# Patient Record
Sex: FEMALE | Race: WHITE | Employment: UNEMPLOYED | URBAN - METROPOLITAN AREA
[De-identification: names, ages, dates, MRNs, and addresses within clinical notes are randomized per-mention and may not be internally consistent; named-entity substitution may affect disease eponyms.]

---

## 2018-08-02 ENCOUNTER — PRE-EVALUATION (OUTPATIENT)
Dept: ORTHOPEDIC SURGERY | Age: 53
End: 2018-08-02

## 2018-08-02 ENCOUNTER — OFFICE VISIT (OUTPATIENT)
Dept: ORTHOPEDIC SURGERY | Age: 53
End: 2018-08-02

## 2018-08-02 VITALS — WEIGHT: 123 LBS | HEIGHT: 62 IN | BODY MASS INDEX: 22.63 KG/M2 | RESPIRATION RATE: 16 BRPM

## 2018-08-02 DIAGNOSIS — M22.2X1 PATELLOFEMORAL PAIN SYNDROME OF RIGHT KNEE: ICD-10-CM

## 2018-08-02 DIAGNOSIS — M65.9 SYNOVITIS OF RIGHT KNEE: Primary | ICD-10-CM

## 2018-08-02 DIAGNOSIS — M17.11 PRIMARY OSTEOARTHRITIS OF RIGHT KNEE: ICD-10-CM

## 2018-08-02 DIAGNOSIS — M25.561 ACUTE PAIN OF RIGHT KNEE: ICD-10-CM

## 2018-08-02 DIAGNOSIS — M65.9 SYNOVITIS OF RIGHT KNEE: ICD-10-CM

## 2018-08-02 PROCEDURE — 99243 OFF/OP CNSLTJ NEW/EST LOW 30: CPT | Performed by: ORTHOPAEDIC SURGERY

## 2018-08-02 PROCEDURE — APPSS15 APP SPLIT SHARED TIME 0-15 MINUTES: Performed by: NURSE PRACTITIONER

## 2018-08-02 RX ORDER — MELOXICAM 15 MG/1
15 TABLET ORAL DAILY
Qty: 30 TABLET | Refills: 1 | Status: SHIPPED | OUTPATIENT
Start: 2018-08-02 | End: 2020-08-27

## 2018-08-02 RX ORDER — METHYLPREDNISOLONE 4 MG/1
TABLET ORAL
Qty: 1 KIT | Refills: 0 | Status: SHIPPED | OUTPATIENT
Start: 2018-08-02 | End: 2018-08-08

## 2018-08-02 NOTE — PROGRESS NOTES
Nimo Raya  [id number]  August 2, 2018    Chief Complaint   Patient presents with    Knee Pain     right knee        History: The patient is a 28-year-old female here today for evaluation regarding her right knee pain. The patient is extremely active individual and is a  during the winter and [employer] [facility] a regular basis throughout the year. She has noted these symptoms fairly constantly for the last month. She denies 1 specific inciting event or injury. She describes pain over the medial aspect of her knee which seems to be aggravated after playing pickle ball. This does improve temporarily with Advil but this seems to be causing some GI upset. She denies any catching or locking symptoms. She denies prior treatments for this. She did lose a fair amount of weight in the last year. This is a consult for right knee pain from Orquidea Mitchell    The patient's  past medical history, medications, allergies,  family history, social history, and have been reviewed, and dated and are recorded in the chart. Pertinent items are noted in HPI. Review of systems reviewed from Pertinent History Form dated on 8/2/18 and available in the patient's chart under the Media tab. Vitals:  Resp 16   Ht 5' 2\" (1.575 m)   Wt 123 lb (55.8 kg)   BMI 22.50 kg/m²     Physical: Ms. Nimo Raya appears well, she is in no apparent distress, she demonstrates appropriate mood & affect. She is alert and oriented to person, place and time. Examination of the right lower extremity reveals no pain with range of motion of the hip. She has no tenderness to palpation about the joint line of the right knee. She does have moderate tenderness to deep palpation over the medial plica. Brittny's maneuvers negative. Range of motion is from 0 degrees to 130 degrees. Strength is 4+ to 5/5 for all muscle groups about the right knee. There is patellofemoral crepitus with range of motion of the right knee.  Varus and valgus stressing of the knees reveals no evidence of instability. There is no effusion in the right knee. Anterior drawer and Lachman are negative bilaterally. Examination of the skin reveals no rashes, ulceration, or lesion, bilaterally in the lower extremities. Sensation to both lower extremities is grossly intact. Exam of both feet reveals pedal pulses intact and brisk cap refill. Patient is able to dorsiflex and wiggle all toes. Deep tendon reflexes of the lower extremities are normal and symmetric. Imagin views of the right knee obtained in the office today were extensively reviewed. There is evidence of mild osteoarthritis of the medial compartment of the right knee. Impression: #1  Right knee synovitis #2  Right knee osteoarthritis   3. Right knee patellofemoral syndrome/patellar chondromalacia    Plan: At this time, we will treat the patient conservatively. The patient was encouraged to modify her activities. She was instructed on a home exercise program.  She was given a Medrol Dosepak. She was also given a prescription for meloxicam.  She may gradually resume her regular activities after the steroids are completed. She will follow-up with me in approximately 1 month and we will reassess her then. If she continues to have pain, we will then consider an injection. The patient may ultimately require an MRI scan to evaluate for medial plica.

## 2018-10-15 ENCOUNTER — OFFICE VISIT (OUTPATIENT)
Dept: ORTHOPEDIC SURGERY | Age: 53
End: 2018-10-15
Payer: COMMERCIAL

## 2018-10-15 VITALS
BODY MASS INDEX: 23.19 KG/M2 | DIASTOLIC BLOOD PRESSURE: 85 MMHG | HEART RATE: 81 BPM | HEIGHT: 62 IN | TEMPERATURE: 98.4 F | SYSTOLIC BLOOD PRESSURE: 128 MMHG | WEIGHT: 126 LBS

## 2018-10-15 DIAGNOSIS — M22.2X1 PATELLOFEMORAL PAIN SYNDROME OF RIGHT KNEE: Primary | ICD-10-CM

## 2018-10-15 PROCEDURE — 99214 OFFICE O/P EST MOD 30 MIN: CPT | Performed by: ORTHOPAEDIC SURGERY

## 2018-10-15 PROCEDURE — 20610 DRAIN/INJ JOINT/BURSA W/O US: CPT | Performed by: ORTHOPAEDIC SURGERY

## 2018-12-18 ENCOUNTER — OFFICE VISIT (OUTPATIENT)
Dept: ORTHOPEDIC SURGERY | Age: 53
End: 2018-12-18
Payer: COMMERCIAL

## 2018-12-18 VITALS
HEIGHT: 62 IN | SYSTOLIC BLOOD PRESSURE: 132 MMHG | DIASTOLIC BLOOD PRESSURE: 80 MMHG | HEART RATE: 73 BPM | BODY MASS INDEX: 23.19 KG/M2 | WEIGHT: 126 LBS | TEMPERATURE: 97.9 F

## 2018-12-18 DIAGNOSIS — M17.11 ARTHRITIS OF RIGHT KNEE: Primary | ICD-10-CM

## 2018-12-18 PROCEDURE — 99212 OFFICE O/P EST SF 10 MIN: CPT | Performed by: PHYSICIAN ASSISTANT

## 2018-12-19 PROBLEM — M17.11 ARTHRITIS OF RIGHT KNEE: Status: ACTIVE | Noted: 2018-12-19

## 2018-12-19 NOTE — PROGRESS NOTES
questions were answered. Risks and benefits of the treatment options also reviewed in detail. She has moderate arthritis of the right knee and fairly young 46-year-old female. Cortisone injection gave good relief short-term. Discussed Visco supplementation injections for the right knee. She is interested in pursuing Visco supplementation. Follow Up: Once the Visco supplementation injection has been authorized. Call or return to clinic prn if these symptoms worsen or fail to improve as anticipated.

## 2019-01-03 ENCOUNTER — TELEPHONE (OUTPATIENT)
Dept: ORTHOPEDIC SURGERY | Age: 54
End: 2019-01-03

## 2020-08-27 ENCOUNTER — APPOINTMENT (OUTPATIENT)
Dept: GENERAL RADIOLOGY | Age: 55
End: 2020-08-27
Payer: COMMERCIAL

## 2020-08-27 ENCOUNTER — HOSPITAL ENCOUNTER (EMERGENCY)
Age: 55
Discharge: HOME | End: 2020-08-27
Attending: EMERGENCY MEDICINE
Payer: COMMERCIAL

## 2020-08-27 VITALS
RESPIRATION RATE: 16 BRPM | SYSTOLIC BLOOD PRESSURE: 137 MMHG | HEART RATE: 74 BPM | BODY MASS INDEX: 25.84 KG/M2 | TEMPERATURE: 98.3 F | HEIGHT: 62 IN | OXYGEN SATURATION: 99 % | WEIGHT: 140.43 LBS | DIASTOLIC BLOOD PRESSURE: 81 MMHG

## 2020-08-27 PROCEDURE — 6370000000 HC RX 637 (ALT 250 FOR IP): Performed by: EMERGENCY MEDICINE

## 2020-08-27 PROCEDURE — 99283 EMERGENCY DEPT VISIT LOW MDM: CPT

## 2020-08-27 PROCEDURE — 73610 X-RAY EXAM OF ANKLE: CPT

## 2020-08-27 RX ORDER — ACETAMINOPHEN 500 MG
1000 TABLET ORAL ONCE
Status: COMPLETED | OUTPATIENT
Start: 2020-08-27 | End: 2020-08-27

## 2020-08-27 RX ADMIN — ACETAMINOPHEN 1000 MG: 500 TABLET, FILM COATED ORAL at 10:38

## 2020-08-27 ASSESSMENT — PAIN SCALES - GENERAL
PAINLEVEL_OUTOF10: 3
PAINLEVEL_OUTOF10: 3
PAINLEVEL_OUTOF10: 4

## 2020-08-27 ASSESSMENT — PAIN DESCRIPTION - DESCRIPTORS: DESCRIPTORS: ACHING

## 2020-08-27 ASSESSMENT — PAIN DESCRIPTION - LOCATION: LOCATION: ANKLE

## 2020-08-27 ASSESSMENT — PAIN DESCRIPTION - ORIENTATION: ORIENTATION: LEFT

## 2020-08-27 ASSESSMENT — PAIN DESCRIPTION - PAIN TYPE: TYPE: ACUTE PAIN

## 2020-08-27 NOTE — ED TRIAGE NOTES
Pt. Was playing pickle ball and rolled her left ankle, has not tried to walk since injury. Swelling noted lateral malleolus, strong pedal pulse present. Ice applied.

## 2020-08-27 NOTE — ED PROVIDER NOTES
Emergency Department provider note:    [facility]    CHIEF COMPLAINT  Ankle Injury (left ankle injury at 0930, rolled ankle playing pickle ball)      HISTORY OF PRESENT ILLNESS  Chiqui Coker is a 50 y.o. female who presents to the ED with pain in her left ankle. She was playing pickle ball about an hour ago and accidentally rolled her left ankle. No prior, no other injury. She complains of pain distal to the left lateral malleolus. She heard a snap when the incident happened. She is able to ambulate. No other complaints, modifying factors or associated symptoms. Nursing notes reviewed. History reviewed. No pertinent past medical history. Past Surgical History:   Procedure Laterality Date     SECTION      x1       History reviewed. No pertinent family history. Social History     Tobacco Use    Smoking status: Never Smoker    Smokeless tobacco: Never Used   Substance Use Topics    Alcohol use: No    Drug use: Never       No current facility-administered medications for this encounter. No current outpatient medications on file. 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 REVIEW OF SYSTEMS  10 systems reviewed, pertinent positives per HPI otherwise noted to be negative    PHYSICAL EXAM  /81   Pulse 74   Temp 98.3 °F (36.8 °C) (Oral)   Resp 16   Ht 5' 2\" (1.575 m)   Wt 140 lb 6.9 oz (63.7 kg)   LMP 2020   SpO2 99%   BMI 25.69 kg/m²   GENERAL APPEARANCE: Awake and alert. Cooperative. No acute distress. HEAD: Normocephalic. Atraumatic. EYES: EOM's grossly intact. ENT: Mucous membranes are moist.   NECK: Supple. Normal ROM. CHEST: Equal symmetric chest rise. LUNGS: Breathing is unlabored. Speaking comfortably in full sentences. HEART:  Regular rhythm. ABDOMEN: Nondistended. No masses. EXTREMITIES: Moves actively. No acute deformities.   She is swollen and tender distal to the lateral malleolus left side. No crepitance over the distal fibula or over the calcaneus. No tenderness over the proximal fifth metatarsal.  No other bony injury noted. Distal pulses, capillary refill, and sensation are intact. She has some limited movement of the ankle. SKIN: Warm and dry. NEUROLOGICAL: Alert and oriented. Strength is 5/5 in all extremities and sensation is intact. LABS  No results found for this visit on 08/27/20. RADIOLOGY  XR ANKLE LEFT (MIN 3 VIEWS)   Preliminary Result   No acute osseous abnormality           I ordered and interpreted x-ray of her left ankle. I see no bony abnormality. This was confirmed by radiology. ED COURSE/MDM  Patient seen and evaluated. Patient was evaluated and x-ray done. No fracture is seen. She was fitted for and placed in an ankle splint with instructions for use. She can follow with orthopedic surgery on call in about 10 days. She does not need crutches. I considered potential for bony fracture or an unstable ankle. Neither are present. Patient was given:   Medications   acetaminophen (TYLENOL) tablet 1,000 mg (1,000 mg Oral Given 8/27/20 1038)        Patient was given scripts for the following medications. I counseled patient how to take these medications. Discharge Medication List as of 8/27/2020 11:13 AM          PATIENT REFERRED TO:  Gregorio Ceja MD  [address] [address]  [phone]    In 1 week  If symptoms worsen       CLINICAL IMPRESSION  1. Sprain of calcaneofibular ligament of left ankle, initial encounter        Blood pressure 137/81, pulse 74, temperature 98.3 °F (36.8 °C), temperature source Oral, resp. rate 16, height 5' 2\" (1.575 m), weight 140 lb 6.9 oz (63.7 kg), last menstrual period 08/27/2020, SpO2 99 %.     DISPOSITION Decision To Discharge 08/27/2020 10:55:50 AM      Comment: Please note this report has been produced using speech recognition software and may contain errors related to that system including errors in grammar, punctuation, and spelling, as well as words and phrases that may be inappropriate. If there are any questions or concerns please feel free to contact the dictating provider for clarification.         Adeola Trevizo MD  08/27/20 1256

## 2020-09-03 ENCOUNTER — OFFICE VISIT (OUTPATIENT)
Dept: ORTHOPEDIC SURGERY | Age: 55
End: 2020-09-03
Payer: COMMERCIAL

## 2020-09-03 VITALS — BODY MASS INDEX: 25.76 KG/M2 | WEIGHT: 140 LBS | TEMPERATURE: 98 F | HEIGHT: 62 IN

## 2020-09-03 PROCEDURE — 99243 OFF/OP CNSLTJ NEW/EST LOW 30: CPT | Performed by: ORTHOPAEDIC SURGERY

## 2020-09-03 PROCEDURE — L4361 PNEUMA/VAC WALK BOOT PRE OTS: HCPCS | Performed by: ORTHOPAEDIC SURGERY

## 2020-09-03 NOTE — PROGRESS NOTES
Delores Miles  <[id number]>  September 3, 2020    Chief Complaint   Patient presents with    Ankle Injury     left ankle DOI 8/27/20       History: The patient is a 68-year-old female who is here for evaluation of her left ankle. The patient was playing pickle ball and she severely inverted her left ankle. This injury occurred on 8/27/2020. She did present to the emergency room and was evaluated. She has been taking Motrin on a regular basis. She denies any numbness or tingling. She denies any history of prior ankle sprains. This is a consult from Pam Mosher MD for left ankle pain and swelling. The patient's  past medical history, medications, allergies,  family history, social history, and have been reviewed, and dated and are recorded in the chart. Pertinent items are noted in HPI. Review of systems reviewed from Pertinent History Form dated on 9/3/20 and available in the patient's chart under the Media tab. Vitals:  Temp 98 °F (36.7 °C)   Ht 5' 2\" (1.575 m)   Wt 140 lb (63.5 kg)   LMP 08/27/2020   BMI 25.61 kg/m²     Physical: Physical Examination:   The patient presents today in no acute distress, awake, alert, and oriented. She is well dressed, nourished and  groomed. Patient with normal affect. The patient ambulates with an antalgic gait. Examination of both ankles showing a decreased range of motion of the left ankle compare to the other side. There is  severe swelling that can be seen, as well as ecchymosis. She has severe tenderness over the anterior talofibular ligament and calcaneofibular ligament. The patient is non tender over the posterior talofibular ligament. The patient has severe tenderness over the syndesmosis. She has intact sensation to light touch in the tibial, peroneal, sural, and saphenous nerve distributions. Pedal pulses are present 2+. The foot shows brisk capillary refill. The patient is able to wiggle all toes.  The patient is non tender to palpation of the mid foot, hind foot, or forefoot. The skin reveals no evidence of blistering or open areas. Imaging:  Xrays, 3 views of the left ankle from initial evaluation were reviewed, and showed no abnormalities. Impression:   Left ankle Grade 2 sprain  #2 left ankle syndesmotic sprain      Plan:   The treatment plan was discussed in detail with the patient. The left ankle will be protected in a tall boot. She may weight bear as tolerated. She was encouraged to ice and elevate as much as possible. The patient will follow up with me in 2-3 week and we will reassess her then.   We will then consider an outpatient physical therapy program.

## 2020-09-04 ENCOUNTER — TELEPHONE (OUTPATIENT)
Dept: ORTHOPEDIC SURGERY | Age: 55
End: 2020-09-04

## 2020-11-16 ENCOUNTER — TELEPHONE (OUTPATIENT)
Dept: ORTHOPEDIC SURGERY | Age: 55
End: 2020-11-16

## 2020-11-16 NOTE — TELEPHONE ENCOUNTER
I called the patient and she did not answer and mailbox was full. I was unable to leave a message. The patient has not followed up as instructed by Dr. Isaura Moreno. She needs an office visit prior to prescribing physical therapy. Please schedule the patient if she calls back.

## 2023-05-08 ENCOUNTER — NURSE TRIAGE (OUTPATIENT)
Dept: OTHER | Facility: CLINIC | Age: 58
End: 2023-05-08

## 2023-05-08 NOTE — TELEPHONE ENCOUNTER
[place]    Received call from [address] [place] [address] at [facility]; Patient with Red Flag Complaint requesting to establish care with [facility]. Prefers woman provider. Sees new OB on Wednesday. Subjective: Caller states \"Abdominal pain\"     Current Symptoms:   Left lower abdominal pain x 3 weeks  Bloating   Denies diarrhea or vomiting, BMs normal.  Denies fever    Hx lactose intolerance. Mother had colon cancer. Onset: November - seen in clinic and dx with kidney stone but was retracted by urologist.      Associated Symptoms: increased wakefulness    Pain Severity: Muscle spasm or \"vibration\"    Improved when up and moving - \"ache\" during the day. 3-4/10 currently \"bothersome\"    Temperature: Denies    What has been tried: GasX after eating, this helps    LMP: NA Pregnant: NA.   Menopause currently. Recommended disposition: See in Office Today. As pt is unestablished, recommended clinic or UCC today. Care advice provided, patient verbalizes understanding; denies any other questions or concerns; instructed to call back for any new or worsening symptoms. Patient/Caller agrees with recommended disposition; writer provided warm transfer to [employer] at [facility] for appointment scheduling to establish care. Attention Provider: Thank you for allowing me to participate in the care of your patient. The patient was connected to triage in response to information provided to the [facility]. Please do not respond through this encounter as the response is not directed to a shared pool.     Reason for Disposition   MODERATE pain (e.g., interferes with normal activities that comes and goes (cramps) lasts > 24 hours  (Exception: Pain with Vomiting or Diarrhea - see that Protocol.)    Protocols used: Abdominal Pain - Female-ADULT-[place]

## 2023-05-09 ENCOUNTER — HOSPITAL ENCOUNTER (OUTPATIENT)
Dept: CT IMAGING | Age: 58
Discharge: HOME | End: 2023-05-09
Payer: COMMERCIAL

## 2023-05-09 DIAGNOSIS — R10.32 LLQ PAIN: ICD-10-CM

## 2023-05-09 PROCEDURE — 74176 CT ABD & PELVIS W/O CONTRAST: CPT

## 2023-05-09 PROCEDURE — 6360000004 HC RX CONTRAST MEDICATION

## 2023-05-09 RX ADMIN — IOPAMIDOL 50 ML: 612 INJECTION, SOLUTION INTRAVENOUS at 09:38

## 2023-05-30 ENCOUNTER — OFFICE VISIT (OUTPATIENT)
Dept: FAMILY MEDICINE CLINIC | Age: 58
End: 2023-05-30
Payer: COMMERCIAL

## 2023-05-30 VITALS
SYSTOLIC BLOOD PRESSURE: 118 MMHG | HEIGHT: 62 IN | WEIGHT: 144 LBS | TEMPERATURE: 97.8 F | HEART RATE: 75 BPM | OXYGEN SATURATION: 98 % | BODY MASS INDEX: 26.5 KG/M2 | DIASTOLIC BLOOD PRESSURE: 68 MMHG

## 2023-05-30 DIAGNOSIS — N39.3 STRESS INCONTINENCE: ICD-10-CM

## 2023-05-30 DIAGNOSIS — Z00.00 ANNUAL PHYSICAL EXAM: Primary | ICD-10-CM

## 2023-05-30 DIAGNOSIS — Z13.220 LIPID SCREENING: ICD-10-CM

## 2023-05-30 DIAGNOSIS — Z23 NEED FOR TDAP VACCINATION: ICD-10-CM

## 2023-05-30 DIAGNOSIS — Z80.0 FAMILY HX OF COLON CANCER REQUIRING SCREENING COLONOSCOPY: ICD-10-CM

## 2023-05-30 PROCEDURE — 90715 TDAP VACCINE 7 YRS/> IM: CPT | Performed by: NURSE PRACTITIONER

## 2023-05-30 PROCEDURE — 99396 PREV VISIT EST AGE 40-64: CPT | Performed by: NURSE PRACTITIONER

## 2023-05-30 PROCEDURE — 90471 IMMUNIZATION ADMIN: CPT | Performed by: NURSE PRACTITIONER

## 2023-05-30 RX ORDER — PROGESTERONE 100 MG/1
100 CAPSULE ORAL DAILY
COMMUNITY

## 2023-05-30 SDOH — ECONOMIC STABILITY: FOOD INSECURITY: WITHIN THE PAST 12 MONTHS, YOU WORRIED THAT YOUR FOOD WOULD RUN OUT BEFORE YOU GOT MONEY TO BUY MORE.: NEVER TRUE

## 2023-05-30 SDOH — ECONOMIC STABILITY: FOOD INSECURITY: WITHIN THE PAST 12 MONTHS, THE FOOD YOU BOUGHT JUST DIDN'T LAST AND YOU DIDN'T HAVE MONEY TO GET MORE.: NEVER TRUE

## 2023-05-30 SDOH — ECONOMIC STABILITY: INCOME INSECURITY: HOW HARD IS IT FOR YOU TO PAY FOR THE VERY BASICS LIKE FOOD, HOUSING, MEDICAL CARE, AND HEATING?: NOT HARD AT ALL

## 2023-05-30 SDOH — ECONOMIC STABILITY: HOUSING INSECURITY
IN THE LAST 12 MONTHS, WAS THERE A TIME WHEN YOU DID NOT HAVE A STEADY PLACE TO SLEEP OR SLEPT IN A SHELTER (INCLUDING NOW)?: NO

## 2023-05-30 ASSESSMENT — ENCOUNTER SYMPTOMS
SHORTNESS OF BREATH: 0
CONSTIPATION: 0
DIARRHEA: 0
VOMITING: 0
COUGH: 0
NAUSEA: 0

## 2023-05-30 NOTE — PROGRESS NOTES
Bernice Joseph (:  1965) is a 66 y.o. female,New patient, here for evaluation of the following chief complaint(s):  New Patient (New patient to get established. Colonoscopy referral. Colon cancer in family . Mammo scheduled for Steve. Pt would like her TDAP today. Is not fasting )         ASSESSMENT/PLAN:  1. Annual physical exam  Reviewed PMH, medications and labs    2. Need for Tdap vaccination  Updated in office today. 3. Family hx of colon cancer requiring screening colonoscopy  Overdue for colonoscopy. Mom had colon ca in 46s, returned at age 80.  - Archie Avendano MD, Gastroenterology, [facility]    4. Stress incontinence  New, worsening. Discussed PT and possible follow up with Urogyn.  - PT pelvic floor activity; Future    5. Lipid screening  Overdue for lipid screen. - Lipid, Fasting; Future       Return for yearly physical.         Subjective   SUBJECTIVE/OBJECTIVE:  HPI  Presents today to establish care and for yearly physical. PMH significant for family colon ca and arthritis. Denies concerns or complaints today. States she needs tetanus updated. Lives at home with , 2  kids. Independent in ADLs. Needs to go to eye doc, wears readers. Dentist every 6 months. Cardiac- denies concerns. Resp-flonase, for congestin    GI/- Had lower abd \"spasms\" treated at urgent care, went to urology, r/o kidney stones. Had CT of abd, small hiatal hernia recently started on omeprazole, reports relief. States she has stress incontinence with sneezing, running or exercise, wears pad daily. Mammogram scheduled in  at [address] [place] [address]- at [facility] [place]. Recently started progesterone at night, PAP last month. Skin-denies concerns.      Current Outpatient Medications   Medication Sig Dispense Refill    progesterone (PROMETRIUM) 100 MG CAPS capsule Take 1 capsule by mouth daily      Cyanocobalamin (VITAMIN B-12 PO) Take by mouth      Multiple Vitamin (MULTIVITAMIN

## 2023-05-30 NOTE — PATIENT INSTRUCTIONS
[facility] [address]  [address] [facility]  [phone]

## 2024-07-18 ENCOUNTER — TELEPHONE (OUTPATIENT)
Dept: FAMILY MEDICINE CLINIC | Age: 59
End: 2024-07-18

## 2024-07-18 NOTE — TELEPHONE ENCOUNTER
Patient due for mammogram. Patient due for one year/physical with Joslyn VELASCO LM for patient to call the office.

## 2024-09-19 ENCOUNTER — TELEPHONE (OUTPATIENT)
Dept: FAMILY MEDICINE CLINIC | Age: 59
End: 2024-09-19

## 2025-05-15 ENCOUNTER — OFFICE VISIT (OUTPATIENT)
Dept: FAMILY MEDICINE CLINIC | Age: 60
End: 2025-05-15
Payer: COMMERCIAL

## 2025-05-15 VITALS
TEMPERATURE: 98.6 F | HEIGHT: 62 IN | BODY MASS INDEX: 28.23 KG/M2 | DIASTOLIC BLOOD PRESSURE: 80 MMHG | HEART RATE: 76 BPM | OXYGEN SATURATION: 98 % | WEIGHT: 153.4 LBS | SYSTOLIC BLOOD PRESSURE: 124 MMHG

## 2025-05-15 DIAGNOSIS — G43.919 INTRACTABLE MIGRAINE WITHOUT STATUS MIGRAINOSUS, UNSPECIFIED MIGRAINE TYPE: Primary | ICD-10-CM

## 2025-05-15 PROCEDURE — G8419 CALC BMI OUT NRM PARAM NOF/U: HCPCS | Performed by: INTERNAL MEDICINE

## 2025-05-15 PROCEDURE — G8427 DOCREV CUR MEDS BY ELIG CLIN: HCPCS | Performed by: INTERNAL MEDICINE

## 2025-05-15 PROCEDURE — 99213 OFFICE O/P EST LOW 20 MIN: CPT | Performed by: INTERNAL MEDICINE

## 2025-05-15 PROCEDURE — 3017F COLORECTAL CA SCREEN DOC REV: CPT | Performed by: INTERNAL MEDICINE

## 2025-05-15 PROCEDURE — 1036F TOBACCO NON-USER: CPT | Performed by: INTERNAL MEDICINE

## 2025-05-15 RX ORDER — MECLIZINE HCL 12.5 MG 12.5 MG/1
12.5 TABLET ORAL 3 TIMES DAILY PRN
Qty: 30 TABLET | Refills: 0 | Status: SHIPPED | OUTPATIENT
Start: 2025-05-15 | End: 2025-05-25

## 2025-05-15 RX ORDER — SUMATRIPTAN 50 MG/1
TABLET, FILM COATED ORAL
Qty: 9 TABLET | Refills: 3 | Status: SHIPPED | OUTPATIENT
Start: 2025-05-15

## 2025-05-15 RX ORDER — ONDANSETRON 4 MG/1
4 TABLET, ORALLY DISINTEGRATING ORAL 3 TIMES DAILY PRN
Qty: 21 TABLET | Refills: 0 | Status: SHIPPED | OUTPATIENT
Start: 2025-05-15

## 2025-05-15 SDOH — ECONOMIC STABILITY: FOOD INSECURITY: WITHIN THE PAST 12 MONTHS, YOU WORRIED THAT YOUR FOOD WOULD RUN OUT BEFORE YOU GOT MONEY TO BUY MORE.: NEVER TRUE

## 2025-05-15 SDOH — ECONOMIC STABILITY: FOOD INSECURITY: WITHIN THE PAST 12 MONTHS, THE FOOD YOU BOUGHT JUST DIDN'T LAST AND YOU DIDN'T HAVE MONEY TO GET MORE.: NEVER TRUE

## 2025-05-15 ASSESSMENT — PATIENT HEALTH QUESTIONNAIRE - PHQ9
SUM OF ALL RESPONSES TO PHQ QUESTIONS 1-9: 0
SUM OF ALL RESPONSES TO PHQ QUESTIONS 1-9: 0
1. LITTLE INTEREST OR PLEASURE IN DOING THINGS: NOT AT ALL
SUM OF ALL RESPONSES TO PHQ QUESTIONS 1-9: 0
SUM OF ALL RESPONSES TO PHQ QUESTIONS 1-9: 0
2. FEELING DOWN, DEPRESSED OR HOPELESS: NOT AT ALL

## 2025-05-15 ASSESSMENT — ENCOUNTER SYMPTOMS: PHOTOPHOBIA: 1

## 2025-05-15 NOTE — PROGRESS NOTES
Catarina Thakkar (:  1965) is a 60 y.o. female,Established patient, here for evaluation of the following chief complaint(s):  Migraine (Pressure in head, dizziness, vision blurry x 4 days)      Subjective       HPI    Headache. Going on for 4 days. Pressure in back/top of head.  Associated with dizziness/vertigo. Some nausea/vomiting. Photophobia. No phonophobia.  In am worse, and then better when can eat and take meds.  Pt has been taking tylenol and nsaid.  Did help but never went away.   No visual aura.    Pt had one migraine headache at the onset of menopause but not had one since.      Review of Systems   Eyes:  Positive for photophobia.   Neurological:  Positive for dizziness and headaches. Negative for weakness.             Objective     /80 (BP Site: Left Upper Arm, Patient Position: Sitting, BP Cuff Size: Medium Adult)   Pulse 76   Temp 98.6 °F (37 °C) (Temporal)   Ht 1.575 m (5' 2\")   Wt 69.6 kg (153 lb 6.4 oz)   SpO2 98%   BMI 28.06 kg/m²      Physical Exam  Vitals and nursing note reviewed.   Constitutional:       Appearance: Normal appearance.   HENT:      Head: Normocephalic and atraumatic.   Eyes:      Extraocular Movements: Extraocular movements intact.      Pupils: Pupils are equal, round, and reactive to light.   Cardiovascular:      Rate and Rhythm: Normal rate and regular rhythm.      Pulses: Normal pulses.      Heart sounds: Normal heart sounds.   Pulmonary:      Effort: Pulmonary effort is normal.      Breath sounds: Normal breath sounds.   Neurological:      General: No focal deficit present.      Mental Status: She is alert and oriented to person, place, and time.      Cranial Nerves: No cranial nerve deficit.      Motor: No weakness.              Catarina was seen today for migraine.    Diagnoses and all orders for this visit:    Intractable migraine without status migrainosus, unspecified migraine type  -     SUMAtriptan (IMITREX) 50 MG tablet; Take one tab daily

## 2025-06-04 ENCOUNTER — PATIENT MESSAGE (OUTPATIENT)
Dept: FAMILY MEDICINE CLINIC | Age: 60
End: 2025-06-04

## 2025-08-26 DIAGNOSIS — G43.919 INTRACTABLE MIGRAINE WITHOUT STATUS MIGRAINOSUS, UNSPECIFIED MIGRAINE TYPE: ICD-10-CM

## 2025-08-26 RX ORDER — SUMATRIPTAN 50 MG/1
TABLET, FILM COATED ORAL
Qty: 9 TABLET | Refills: 3 | Status: SHIPPED | OUTPATIENT
Start: 2025-08-26

## 2025-08-27 ENCOUNTER — TELEPHONE (OUTPATIENT)
Dept: FAMILY MEDICINE CLINIC | Age: 60
End: 2025-08-27

## 2025-08-27 RX ORDER — MECLIZINE HCL 12.5 MG 12.5 MG/1
12.5 TABLET ORAL 3 TIMES DAILY PRN
Qty: 30 TABLET | Refills: 0 | Status: SHIPPED | OUTPATIENT
Start: 2025-08-27 | End: 2025-09-06